# Patient Record
Sex: MALE | Race: WHITE | Employment: FULL TIME | URBAN - METROPOLITAN AREA
[De-identification: names, ages, dates, MRNs, and addresses within clinical notes are randomized per-mention and may not be internally consistent; named-entity substitution may affect disease eponyms.]

---

## 2019-07-27 ENCOUNTER — HOSPITAL ENCOUNTER (EMERGENCY)
Age: 29
Discharge: HOME | End: 2019-07-27
Attending: EMERGENCY MEDICINE
Payer: COMMERCIAL

## 2019-07-27 ENCOUNTER — APPOINTMENT (OUTPATIENT)
Dept: CT IMAGING | Age: 29
End: 2019-07-27
Attending: EMERGENCY MEDICINE
Payer: COMMERCIAL

## 2019-07-27 VITALS
DIASTOLIC BLOOD PRESSURE: 72 MMHG | HEIGHT: 73 IN | TEMPERATURE: 97.9 F | HEART RATE: 65 BPM | BODY MASS INDEX: 33.13 KG/M2 | SYSTOLIC BLOOD PRESSURE: 135 MMHG | WEIGHT: 250 LBS | RESPIRATION RATE: 18 BRPM | OXYGEN SATURATION: 98 %

## 2019-07-27 DIAGNOSIS — S00.03XA CONTUSION OF SCALP, INITIAL ENCOUNTER: Primary | ICD-10-CM

## 2019-07-27 PROCEDURE — 70450 CT HEAD/BRAIN W/O DYE: CPT

## 2019-07-27 PROCEDURE — 99283 EMERGENCY DEPT VISIT LOW MDM: CPT | Performed by: EMERGENCY MEDICINE

## 2019-07-27 PROCEDURE — 74011250637 HC RX REV CODE- 250/637: Performed by: EMERGENCY MEDICINE

## 2019-07-27 RX ORDER — ONDANSETRON 4 MG/1
4 TABLET, ORALLY DISINTEGRATING ORAL
Qty: 15 TAB | Refills: 0 | Status: SHIPPED | OUTPATIENT
Start: 2019-07-27

## 2019-07-27 RX ORDER — ONDANSETRON 4 MG/1
4 TABLET, ORALLY DISINTEGRATING ORAL ONCE
Status: COMPLETED | OUTPATIENT
Start: 2019-07-27 | End: 2019-07-27

## 2019-07-27 RX ADMIN — ONDANSETRON 4 MG: 4 TABLET, ORALLY DISINTEGRATING ORAL at 09:18

## 2019-07-27 NOTE — ED PROVIDER NOTES
HPI:  33 male in no chronic medical problems with head injury. Stated injury occurred approximately 7:15 AM.  He was 1 foot off the ground on a step ladder when he lost his balance and fell backward and struck the back of his head on a countertop. Stated loss of consciousness for unknown duration of time. Woke up and had some balance and issued as well as nausea and vomiting times one. Has a headache. The only residual symptom now is a headaches. He denies any daily medication. No prior surgery. Denies any drug use. Denies any alcohol consumption today. Not described as worse headache of life. No weakness tingling or numbness. ROS  Constitutional: No fever, no chills  Skin: no rash  Eye: No vision changes  ENMT: No sore throat  Respiratory: No shortness of breath, no cough  Cardiovascular: No chest pain, no palpitations  Gastrointestinal: No vomiting, no nausea, no diarrhea, no abdominal pain  : No dysuria  MSK: No back pain, no muscle pain, no joint pain  Neuro: + headache, no change in mental status, no numbness, no tingling, no weakness  Psych:   Endocrine:   All other review of systems positive per history of present illness and the above otherwise negative or noncontributory. Visit Vitals  /82 (BP 1 Location: Right arm)   Pulse 74   Temp 97.9 °F (36.6 °C)   Resp 18   Ht 6' 1\" (1.854 m)   Wt 113.4 kg (250 lb)   SpO2 97%   BMI 32.98 kg/m²     History reviewed. No pertinent past medical history. History reviewed. No pertinent surgical history. None         Adult Exam   General: alert, no acute distress  Head:  Hematoma noted on the midline occipital.  No bogginess.   ENT: moist mucous membranes  Neck: supple, non-tender; full range of motion  No midline C, T, L-spine tenderness or step-off noted  Cardiovascular: regular rate and rhythm, normal peripheral perfusion, no edema  Respiratory:  normal respirations; no wheezing, rales or rhonchi  Gastrointestinal: soft, non-tender; no rebound or guarding, no peritoneal signs, no distension  Back: non-tender, full range of motion  Musculoskeletal: normal range of motion, normal strength, no gross deformities  Neurological: alert and oriented x 4, no gross focal deficits; normal speech. No pronator drift. Psychiatric: cooperative; appropriate mood and affect    MDM:  Midline head injury. No spinous injury. Concern for head injury with intracranial pathology given fall, symptoms. We'll obtain CT scan of the head. 10:30 AM  CT head unremarkable. Neurologic exam intact. Recommend Tylenol or Motrin as needed for headaches. Zofran as needed for nausea. We'll give head injury precaution and concussion precaution. Return precautions given. Recommended follow-up with primary care physician for checkup. Stable for discharge    Ct Head Wo Cont    Result Date: 7/27/2019  History: fall back and hit head on counter top while on step ladder. ? LOC. + n/v Exam: CT head without contrast Technique: Thin section axial CT images were obtained from the skullbase through the vertex. Radiation dose reduction techniques were used for this study. Our CT scanners use one or all of the following: Automated exposure control, adjustment of the mA and/or kV according to patient size, use of iterative reconstruction. Findings: The ventricles are normal in size, shape, and position. No abnormal parenchymal density is present. No extra-axial fluid collection is present. There is no mass or mass-effect. The basilar cisterns are patent. The paranasal sinuses and mastoid air cells are clear. Impression: Unremarkable CT head without contrast.       Dragon voice recognition software was used to create this note. Although the note has been reviewed and corrected where necessary, additional errors may have been overlooked and remain in the text.

## 2019-07-27 NOTE — DISCHARGE INSTRUCTIONS
Take Tylenol Motrin as needed for headache. Take Zofran as needed for nausea and vomiting. Return if worsening symptoms. Follow-up with your doctor for checkup.

## 2019-07-27 NOTE — ED TRIAGE NOTES
Pt presents to the ED post fall approximately 1 foot from step ladder and struck the back of his head.   Unsure if he had LOC,  Denies nausea,  Vomiting x 1

## 2019-07-27 NOTE — ED NOTES
I have reviewed discharge instructions with the patient. The patient verbalized understanding. Patient left ED via Discharge Method: ambulatory to Home with spouse. Opportunity for questions and clarification provided. Patient given 1 scripts. To continue your aftercare when you leave the hospital, you may receive an automated call from our care team to check in on how you are doing. This is a free service and part of our promise to provide the best care and service to meet your aftercare needs.  If you have questions, or wish to unsubscribe from this service please call [phone]. Thank you for Choosing our [facility].